# Patient Record
Sex: MALE | Race: WHITE | Employment: FULL TIME | URBAN - NONMETROPOLITAN AREA
[De-identification: names, ages, dates, MRNs, and addresses within clinical notes are randomized per-mention and may not be internally consistent; named-entity substitution may affect disease eponyms.]

---

## 2023-11-04 ENCOUNTER — APPOINTMENT (OUTPATIENT)
Dept: GENERAL RADIOLOGY | Age: 68
End: 2023-11-04
Payer: MEDICARE

## 2023-11-04 ENCOUNTER — HOSPITAL ENCOUNTER (EMERGENCY)
Age: 68
Discharge: HOME | End: 2023-11-04
Attending: EMERGENCY MEDICINE
Payer: MEDICARE

## 2023-11-04 ENCOUNTER — APPOINTMENT (OUTPATIENT)
Dept: CT IMAGING | Age: 68
End: 2023-11-04
Payer: MEDICARE

## 2023-11-04 VITALS
RESPIRATION RATE: 18 BRPM | OXYGEN SATURATION: 99 % | WEIGHT: 230 LBS | HEART RATE: 78 BPM | DIASTOLIC BLOOD PRESSURE: 85 MMHG | TEMPERATURE: 98.3 F | SYSTOLIC BLOOD PRESSURE: 157 MMHG

## 2023-11-04 DIAGNOSIS — R03.0 ELEVATED BLOOD PRESSURE READING WITHOUT DIAGNOSIS OF HYPERTENSION: ICD-10-CM

## 2023-11-04 DIAGNOSIS — R42 DIZZINESS: Primary | ICD-10-CM

## 2023-11-04 LAB
ALBUMIN SERPL-MCNC: 4.2 G/DL (ref 3.5–5.2)
ALP SERPL-CCNC: 51 U/L (ref 40–130)
ALT SERPL-CCNC: 23 U/L (ref 5–41)
ANION GAP SERPL CALCULATED.3IONS-SCNC: 12 MMOL/L (ref 7–19)
AST SERPL-CCNC: 25 U/L (ref 5–40)
BASOPHILS # BLD: 0 K/UL (ref 0–0.2)
BASOPHILS NFR BLD: 0.5 % (ref 0–1)
BILIRUB SERPL-MCNC: 1.5 MG/DL (ref 0.2–1.2)
BUN SERPL-MCNC: 13 MG/DL (ref 8–23)
CALCIUM SERPL-MCNC: 8.8 MG/DL (ref 8.8–10.2)
CHLORIDE SERPL-SCNC: 103 MMOL/L (ref 98–111)
CO2 SERPL-SCNC: 22 MMOL/L (ref 22–29)
CREAT SERPL-MCNC: 0.8 MG/DL (ref 0.5–1.2)
EOSINOPHIL # BLD: 0 K/UL (ref 0–0.6)
EOSINOPHIL NFR BLD: 0 % (ref 0–5)
ERYTHROCYTE [DISTWIDTH] IN BLOOD BY AUTOMATED COUNT: 13.9 % (ref 11.5–14.5)
GLUCOSE SERPL-MCNC: 102 MG/DL (ref 74–109)
HCT VFR BLD AUTO: 37.6 % (ref 42–52)
HGB BLD-MCNC: 12.9 G/DL (ref 14–18)
IMM GRANULOCYTES # BLD: 0 K/UL
INR PPP: 1.04 (ref 0.88–1.18)
LYMPHOCYTES # BLD: 0.8 K/UL (ref 1.1–4.5)
LYMPHOCYTES NFR BLD: 11.7 % (ref 20–40)
MAGNESIUM SERPL-MCNC: 1.9 MG/DL (ref 1.6–2.4)
MCH RBC QN AUTO: 35.4 PG (ref 27–31)
MCHC RBC AUTO-ENTMCNC: 34.3 G/DL (ref 33–37)
MCV RBC AUTO: 103.3 FL (ref 80–94)
MONOCYTES # BLD: 0.4 K/UL (ref 0–0.9)
MONOCYTES NFR BLD: 6.3 % (ref 0–10)
NEUTROPHILS # BLD: 5.2 K/UL (ref 1.5–7.5)
NEUTS SEG NFR BLD: 81 % (ref 50–65)
PLATELET # BLD AUTO: 168 K/UL (ref 130–400)
PMV BLD AUTO: 10.6 FL (ref 9.4–12.4)
POTASSIUM SERPL-SCNC: 3.8 MMOL/L (ref 3.5–5)
PROT SERPL-MCNC: 6.7 G/DL (ref 6.6–8.7)
PROTHROMBIN TIME: 13.3 SEC (ref 12–14.6)
RBC # BLD AUTO: 3.64 M/UL (ref 4.7–6.1)
SODIUM SERPL-SCNC: 137 MMOL/L (ref 136–145)
TROPONIN, HIGH SENSITIVITY: 18 NG/L (ref 0–22)
TSH SERPL DL<=0.005 MIU/L-ACNC: 2.89 UIU/ML (ref 0.27–4.2)
WBC # BLD AUTO: 6.4 K/UL (ref 4.8–10.8)

## 2023-11-04 PROCEDURE — 71045 X-RAY EXAM CHEST 1 VIEW: CPT

## 2023-11-04 PROCEDURE — 36415 COLL VENOUS BLD VENIPUNCTURE: CPT

## 2023-11-04 PROCEDURE — 84484 ASSAY OF TROPONIN QUANT: CPT

## 2023-11-04 PROCEDURE — 84443 ASSAY THYROID STIM HORMONE: CPT

## 2023-11-04 PROCEDURE — 83735 ASSAY OF MAGNESIUM: CPT

## 2023-11-04 PROCEDURE — 99285 EMERGENCY DEPT VISIT HI MDM: CPT

## 2023-11-04 PROCEDURE — 85025 COMPLETE CBC W/AUTO DIFF WBC: CPT

## 2023-11-04 PROCEDURE — 70450 CT HEAD/BRAIN W/O DYE: CPT

## 2023-11-04 PROCEDURE — 85610 PROTHROMBIN TIME: CPT

## 2023-11-04 PROCEDURE — 80053 COMPREHEN METABOLIC PANEL: CPT

## 2023-11-04 ASSESSMENT — ENCOUNTER SYMPTOMS
SHORTNESS OF BREATH: 0
ABDOMINAL PAIN: 0
VOMITING: 0

## 2023-11-04 NOTE — ED PROVIDER NOTES
[facility] EMERGENCY DEPT  EMERGENCY DEPARTMENT ENCOUNTER      Pt Name: Cyril Nicholas  MRN: [id number]  [address] [facility] [place] 1955  Date of evaluation: 11/4/2023  Provider: Lianna Perez DO    CHIEF COMPLAINT       Chief Complaint   Patient presents with    Dizziness     Dizziness with near syncopal episode at work         HISTORY OF PRESENT ILLNESS   (Location/Symptom, Timing/Onset, Context/Setting, Quality, Duration, Modifying Factors, Severity)  Note limiting factors. This is a 81-year-old male presenting to the emergency department secondary to dizziness. The patient was at work mixing paint for a customer when he had a sudden onset of feeling dizzy. The dizziness was described as a sensation that he was going to pass out. The incident lasted approximately 15 minutes and resolved on its own. No complaints of focal weakness, speech changes, visual changes, chest pain, or shortness of breath. The history is provided by the patient. Nursing Notes were reviewed. REVIEW OF SYSTEMS    (2-9 systems for level 4, 10 or more for level 5)     Review of Systems   Respiratory:  Negative for shortness of breath. Gastrointestinal:  Negative for abdominal pain and vomiting. Neurological:  Positive for dizziness. All other systems reviewed and are negative. Except as noted above the remainder of the review of systems was reviewed and negative. PAST MEDICAL HISTORY   No past medical history on file. SURGICAL HISTORY     No past surgical history on file. CURRENT MEDICATIONS       There are no discharge medications for this patient. ALLERGIES     Patient has no known allergies. FAMILY HISTORY     No family history on file.        SOCIAL HISTORY       Social History     Socioeconomic History    Marital status:        SCREENINGS         [place] Coma Scale  Eye Opening: Spontaneous  Best Verbal Response: Oriented  Best Motor Response: Obeys commands  [place] Coma Scale Score: 15 (electronically signed)  Attending Emergency Physician          Monisha Rendon DO  11/05/23 1413

## 2023-11-04 NOTE — DISCHARGE INSTRUCTIONS
Please follow-up with the primary physician within the next week. Return for worsening symptoms or any acute changes.

## 2023-11-06 ENCOUNTER — TELEPHONE (OUTPATIENT)
Dept: NEUROLOGY | Age: 68
End: 2023-11-06

## 2023-11-06 NOTE — TELEPHONE ENCOUNTER
Patient was in the ED and was told to Schedule an appointment with Olu Buckner MD (Neurology) in 1 week (11/11/2023); for the Dizziness. Please called the patient.         Thank you

## 2023-11-15 NOTE — TELEPHONE ENCOUNTER
Spoke with patient, he is aware of appointment time and date. Patient is aware if symptoms worsen to report back to the ED.

## 2024-01-18 ENCOUNTER — OFFICE VISIT (OUTPATIENT)
Dept: NEUROLOGY | Age: 69
End: 2024-01-18

## 2024-01-18 VITALS — SYSTOLIC BLOOD PRESSURE: 138 MMHG | HEART RATE: 89 BPM | DIASTOLIC BLOOD PRESSURE: 89 MMHG | WEIGHT: 230 LBS

## 2024-01-18 DIAGNOSIS — R42 DIZZY: Primary | ICD-10-CM

## 2024-01-18 DIAGNOSIS — Z95.828 HISTORY OF INTRAVASCULAR STENT PLACEMENT: ICD-10-CM

## 2024-01-18 RX ORDER — ASPIRIN 81 MG/1
81 TABLET ORAL DAILY
COMMUNITY

## 2024-01-18 NOTE — PROGRESS NOTES
Chief Complaint   Patient presents with    New Patient     Establishing care for isolated incident of dizziness, patient states he has not symptoms today.        Jones Muller is a 68 y.o. year old male who is seen for evaluation of dizziness.  He had a dizzy spell in November, approximately 2 months ago.  He was at work.  He felt like his thinking and talking more slow.  Someone told him his speech was slurred.  He lost his balance.  The whole episode lasted about 5 minutes.  Responders on the scene found no abnormalities.  He went to the ED and had a negative CT of the brain and routine blood studies.  Those records are reviewed today.  The cause for his symptoms was uncertain and he was referred here.  Denies any further difficulty since that time.  Does have a history of a coronary artery stent back in 2013.  He is supposed to be on aspirin daily but takes it sporadically.  Denies any signs or symptoms suggestive of ongoing TIAs or seizure disorder..    Active Ambulatory Problems     Diagnosis Date Noted    No Active Ambulatory Problems     Resolved Ambulatory Problems     Diagnosis Date Noted    No Resolved Ambulatory Problems     No Additional Past Medical History       Past Surgical History:   Procedure Laterality Date    CORONARY STENT PLACEMENT  2013 2013 heart attack , coronary stent  Dr. Santiago  [place], [place]  [phone]       Family History   Problem Relation Age of Onset    Prostate Cancer Father     Kidney stones Father     Heart Attack Father        No Known Allergies    Social History     Socioeconomic History    Marital status:      Spouse name: Not on file    Number of children: Not on file    Years of education: Not on file    Highest education level: Not on file   Occupational History    Not on file   Tobacco Use    Smoking status: Never    Smokeless tobacco: Never   Substance and Sexual Activity    Alcohol use: Not Currently    Drug use: Never    Sexual activity: Not Currently

## 2024-11-01 ENCOUNTER — HOSPITAL ENCOUNTER (INPATIENT)
Age: 69
LOS: 1 days | DRG: 283 | End: 2024-11-02
Attending: EMERGENCY MEDICINE | Admitting: INTERNAL MEDICINE
Payer: MEDICARE

## 2024-11-01 ENCOUNTER — APPOINTMENT (OUTPATIENT)
Age: 69
DRG: 283 | End: 2024-11-01
Attending: INTERNAL MEDICINE
Payer: MEDICARE

## 2024-11-01 ENCOUNTER — APPOINTMENT (OUTPATIENT)
Dept: VASCULAR LAB | Age: 69
DRG: 283 | End: 2024-11-01
Attending: INTERNAL MEDICINE
Payer: MEDICARE

## 2024-11-01 DIAGNOSIS — R57.0 CARDIOGENIC SHOCK: ICD-10-CM

## 2024-11-01 DIAGNOSIS — I21.4 NSTEMI (NON-ST ELEVATED MYOCARDIAL INFARCTION) (HCC): Primary | ICD-10-CM

## 2024-11-01 DIAGNOSIS — I21.3 STEMI (ST ELEVATION MYOCARDIAL INFARCTION) (HCC): ICD-10-CM

## 2024-11-01 DIAGNOSIS — I46.9 CARDIAC ARREST: ICD-10-CM

## 2024-11-01 DIAGNOSIS — I50.9 ACUTE CONGESTIVE HEART FAILURE, UNSPECIFIED HEART FAILURE TYPE (HCC): ICD-10-CM

## 2024-11-01 DIAGNOSIS — I26.94 MULTIPLE SUBSEGMENTAL PULMONARY EMBOLI WITHOUT ACUTE COR PULMONALE (HCC): ICD-10-CM

## 2024-11-01 LAB
ALBUMIN SERPL-MCNC: 4.1 G/DL (ref 3.5–5.2)
ALP SERPL-CCNC: 50 U/L (ref 40–129)
ALT SERPL-CCNC: 38 U/L (ref 5–41)
ANION GAP SERPL CALCULATED.3IONS-SCNC: 17 MMOL/L (ref 7–19)
ANTI-XA UNFRAC HEPARIN: <0.1 IU/ML
ANTI-XA UNFRAC HEPARIN: >1.1 IU/ML
AST SERPL-CCNC: 51 U/L (ref 5–40)
BASOPHILS # BLD: 0 K/UL (ref 0–0.2)
BASOPHILS NFR BLD: 0.3 % (ref 0–1)
BILIRUB SERPL-MCNC: 2.7 MG/DL (ref 0.2–1.2)
BNP BLD-MCNC: ABNORMAL PG/ML (ref 0–124)
BUN SERPL-MCNC: 17 MG/DL (ref 8–23)
CALCIUM SERPL-MCNC: 8.8 MG/DL (ref 8.8–10.2)
CHLORIDE SERPL-SCNC: 101 MMOL/L (ref 98–111)
CO2 SERPL-SCNC: 22 MMOL/L (ref 22–29)
CREAT SERPL-MCNC: 0.9 MG/DL (ref 0.7–1.2)
ECHO AO ASC DIAM: 2.9 CM
ECHO AO ASCENDING AORTA INDEX: 1.32 CM/M2
ECHO AO ROOT DIAM: 2.1 CM
ECHO AO ROOT INDEX: 0.95 CM/M2
ECHO AO SINUS VALSALVA DIAM: 2.8 CM
ECHO AO SINUS VALSALVA INDEX: 1.27 CM/M2
ECHO AO ST JNCT DIAM: 2.5 CM
ECHO AV AREA PEAK VELOCITY: 2.4 CM2
ECHO AV AREA VTI: 2.7 CM2
ECHO AV AREA/BSA PEAK VELOCITY: 1.1 CM2/M2
ECHO AV AREA/BSA VTI: 1.2 CM2/M2
ECHO AV MEAN GRADIENT: 3 MMHG
ECHO AV MEAN VELOCITY: 0.8 M/S
ECHO AV PEAK GRADIENT: 4 MMHG
ECHO AV PEAK VELOCITY: 1 M/S
ECHO AV REGURGITANT FRACTION: -2649 %
ECHO AV REGURGITANT VOLUME: -1066.8 ML
ECHO AV VELOCITY RATIO: 0.6
ECHO AV VTI: 14.8 CM
ECHO BSA: 2.24 M2
ECHO EST RA PRESSURE: 3 MMHG
ECHO IVC PROX: 1.8 CM
ECHO LA AREA 2C: 23.7 CM2
ECHO LA AREA 4C: 24.2 CM2
ECHO LA DIAMETER INDEX: 2.09 CM/M2
ECHO LA DIAMETER: 4.6 CM
ECHO LA MAJOR AXIS: 5.8 CM
ECHO LA MINOR AXIS: 6.2 CM
ECHO LA TO AORTIC ROOT RATIO: 2.19
ECHO LA VOL BP: 79 ML (ref 18–58)
ECHO LA VOL MOD A2C: 75 ML (ref 18–58)
ECHO LA VOL MOD A4C: 83 ML (ref 18–58)
ECHO LA VOL/BSA BIPLANE: 36 ML/M2 (ref 16–34)
ECHO LA VOLUME INDEX MOD A2C: 34 ML/M2 (ref 16–34)
ECHO LA VOLUME INDEX MOD A4C: 38 ML/M2 (ref 16–34)
ECHO LV E' LATERAL VELOCITY: 6 CM/S
ECHO LV E' SEPTAL VELOCITY: 4.7 CM/S
ECHO LV EDV A2C: 281 ML
ECHO LV EDV A4C: 270 ML
ECHO LV EDV INDEX A4C: 123 ML/M2
ECHO LV EDV NDEX A2C: 128 ML/M2
ECHO LV EJECTION FRACTION A2C: 18 %
ECHO LV EJECTION FRACTION A4C: 20 %
ECHO LV EJECTION FRACTION BIPLANE: 20 % (ref 55–100)
ECHO LV ESV A2C: 230 ML
ECHO LV ESV A4C: 214 ML
ECHO LV ESV INDEX A2C: 105 ML/M2
ECHO LV ESV INDEX A4C: 97 ML/M2
ECHO LV FRACTIONAL SHORTENING: 5 % (ref 28–44)
ECHO LV INTERNAL DIMENSION DIASTOLE INDEX: 2.91 CM/M2
ECHO LV INTERNAL DIMENSION DIASTOLIC: 6.4 CM (ref 4.2–5.9)
ECHO LV INTERNAL DIMENSION SYSTOLIC INDEX: 2.77 CM/M2
ECHO LV INTERNAL DIMENSION SYSTOLIC: 6.1 CM
ECHO LV ISOVOLUMETRIC RELAXATION TIME (IVRT): 92 MS
ECHO LV IVSD: 1.1 CM (ref 0.6–1)
ECHO LV MASS 2D: 311.7 G (ref 88–224)
ECHO LV MASS INDEX 2D: 141.7 G/M2 (ref 49–115)
ECHO LV POSTERIOR WALL DIASTOLIC: 1.1 CM (ref 0.6–1)
ECHO LV RELATIVE WALL THICKNESS RATIO: 0.34
ECHO LVOT AREA: 3.8 CM2
ECHO LVOT AV VTI INDEX: 0.72
ECHO LVOT DIAM: 2.2 CM
ECHO LVOT MEAN GRADIENT: 1 MMHG
ECHO LVOT PEAK GRADIENT: 2 MMHG
ECHO LVOT PEAK VELOCITY: 0.6 M/S
ECHO LVOT STROKE VOLUME INDEX: 18.3 ML/M2
ECHO LVOT SV: 40.3 ML
ECHO LVOT VTI: 10.6 CM
ECHO MV A VELOCITY: 0.43 M/S
ECHO MV ANNULUS DIAMETER: 3.4 CM
ECHO MV AREA VTI: 2 CM2
ECHO MV E DECELERATION TIME (DT): 138 MS
ECHO MV E VELOCITY: 0.86 M/S
ECHO MV E/A RATIO: 2
ECHO MV E/E' LATERAL: 14.33
ECHO MV E/E' RATIO (AVERAGED): 16.32
ECHO MV E/E' SEPTAL: 18.3
ECHO MV LVOT VTI INDEX: 1.91
ECHO MV MAX VELOCITY: 0.8 M/S
ECHO MV MEAN GRADIENT: 2 MMHG
ECHO MV MEAN VELOCITY: 0.5 M/S
ECHO MV PEAK GRADIENT: 3 MMHG
ECHO MV REGURGITANT FRACTION CONT EQ: 96 %
ECHO MV REGURGITANT PEAK GRADIENT: 88 MMHG
ECHO MV REGURGITANT PEAK VELOCITY: 4.7 M/S
ECHO MV REGURGITANT VOLUME: 1066.83 ML
ECHO MV REGURGITANT VTIA: 122 CM
ECHO MV VTI: 20.2 CM
ECHO RA AREA 4C: 12.3 CM2
ECHO RA END SYSTOLIC VOLUME APICAL 4 CHAMBER INDEX BSA: 14 ML/M2
ECHO RA MAJOR AXIS INDEX: 2.05 CM/M2
ECHO RA MAJOR AXIS: 4.5 CM
ECHO RA MINOR AXIS INDEX: 1.59 CM/M2
ECHO RA MINOR AXIS: 3.5 CM
ECHO RA VOLUME: 30 ML
ECHO RIGHT VENTRICULAR SYSTOLIC PRESSURE (RVSP): 28 MMHG
ECHO RV BASAL DIMENSION: 3.5 CM
ECHO RV INTERNAL DIMENSION: 3 CM
ECHO RV LONGITUDINAL DIMENSION: 10.1 CM
ECHO RV MID DIMENSION: 3.4 CM
ECHO RV TAPSE: 1.4 CM (ref 1.7–?)
ECHO TV REGURGITANT MAX VELOCITY: 2.51 M/S
ECHO TV REGURGITANT PEAK GRADIENT: 25 MMHG
EOSINOPHIL # BLD: 0 K/UL (ref 0–0.6)
EOSINOPHIL NFR BLD: 0 % (ref 0–5)
ERYTHROCYTE [DISTWIDTH] IN BLOOD BY AUTOMATED COUNT: 15.6 % (ref 11.5–14.5)
GLUCOSE SERPL-MCNC: 140 MG/DL (ref 70–99)
HCT VFR BLD AUTO: 38.2 % (ref 42–52)
HGB BLD-MCNC: 12.1 G/DL (ref 14–18)
IMM GRANULOCYTES # BLD: 0.1 K/UL
LYMPHOCYTES # BLD: 0.9 K/UL (ref 1.1–4.5)
LYMPHOCYTES NFR BLD: 9.6 % (ref 20–40)
MAGNESIUM SERPL-MCNC: 2.1 MG/DL (ref 1.6–2.4)
MCH RBC QN AUTO: 34.1 PG (ref 27–31)
MCHC RBC AUTO-ENTMCNC: 31.7 G/DL (ref 33–37)
MCV RBC AUTO: 107.6 FL (ref 80–94)
MONOCYTES # BLD: 0.5 K/UL (ref 0–0.9)
MONOCYTES NFR BLD: 5.7 % (ref 0–10)
NEUTROPHILS # BLD: 7.7 K/UL (ref 1.5–7.5)
NEUTS SEG NFR BLD: 83.5 % (ref 50–65)
PLATELET # BLD AUTO: 207 K/UL (ref 130–400)
PMV BLD AUTO: 12.1 FL (ref 9.4–12.4)
POTASSIUM SERPL-SCNC: 4.2 MMOL/L (ref 3.5–5)
PROT SERPL-MCNC: 6.9 G/DL (ref 6.4–8.3)
RBC # BLD AUTO: 3.55 M/UL (ref 4.7–6.1)
SODIUM SERPL-SCNC: 140 MMOL/L (ref 136–145)
TROPONIN, HIGH SENSITIVITY: 1021 NG/L (ref 0–22)
TROPONIN, HIGH SENSITIVITY: 760 NG/L (ref 0–22)
WBC # BLD AUTO: 9.3 K/UL (ref 4.8–10.8)

## 2024-11-01 PROCEDURE — C8929 TTE W OR WO FOL WCON,DOPPLER: HCPCS

## 2024-11-01 PROCEDURE — 2700000000 HC OXYGEN THERAPY PER DAY

## 2024-11-01 PROCEDURE — 85025 COMPLETE CBC W/AUTO DIFF WBC: CPT

## 2024-11-01 PROCEDURE — 6360000002 HC RX W HCPCS: Performed by: INTERNAL MEDICINE

## 2024-11-01 PROCEDURE — 83880 ASSAY OF NATRIURETIC PEPTIDE: CPT

## 2024-11-01 PROCEDURE — 2580000003 HC RX 258: Performed by: INTERNAL MEDICINE

## 2024-11-01 PROCEDURE — 99285 EMERGENCY DEPT VISIT HI MDM: CPT

## 2024-11-01 PROCEDURE — 99223 1ST HOSP IP/OBS HIGH 75: CPT | Performed by: INTERNAL MEDICINE

## 2024-11-01 PROCEDURE — 96375 TX/PRO/DX INJ NEW DRUG ADDON: CPT

## 2024-11-01 PROCEDURE — 84484 ASSAY OF TROPONIN QUANT: CPT

## 2024-11-01 PROCEDURE — 93970 EXTREMITY STUDY: CPT

## 2024-11-01 PROCEDURE — 83735 ASSAY OF MAGNESIUM: CPT

## 2024-11-01 PROCEDURE — 93005 ELECTROCARDIOGRAM TRACING: CPT | Performed by: EMERGENCY MEDICINE

## 2024-11-01 PROCEDURE — 96374 THER/PROPH/DIAG INJ IV PUSH: CPT

## 2024-11-01 PROCEDURE — 80053 COMPREHEN METABOLIC PANEL: CPT

## 2024-11-01 PROCEDURE — 6370000000 HC RX 637 (ALT 250 FOR IP): Performed by: INTERNAL MEDICINE

## 2024-11-01 PROCEDURE — 6360000002 HC RX W HCPCS: Performed by: EMERGENCY MEDICINE

## 2024-11-01 PROCEDURE — 2000000000 HC ICU R&B

## 2024-11-01 PROCEDURE — 6360000004 HC RX CONTRAST MEDICATION: Performed by: INTERNAL MEDICINE

## 2024-11-01 PROCEDURE — 85520 HEPARIN ASSAY: CPT

## 2024-11-01 PROCEDURE — 36415 COLL VENOUS BLD VENIPUNCTURE: CPT

## 2024-11-01 RX ORDER — FUROSEMIDE 10 MG/ML
40 INJECTION INTRAMUSCULAR; INTRAVENOUS ONCE
Status: COMPLETED | OUTPATIENT
Start: 2024-11-01 | End: 2024-11-01

## 2024-11-01 RX ORDER — HYDRALAZINE HYDROCHLORIDE 20 MG/ML
10 INJECTION INTRAMUSCULAR; INTRAVENOUS EVERY 6 HOURS PRN
Status: DISCONTINUED | OUTPATIENT
Start: 2024-11-01 | End: 2024-11-02 | Stop reason: HOSPADM

## 2024-11-01 RX ORDER — HEPARIN SODIUM 1000 [USP'U]/ML
2000 INJECTION, SOLUTION INTRAVENOUS; SUBCUTANEOUS PRN
Status: DISCONTINUED | OUTPATIENT
Start: 2024-11-01 | End: 2024-11-02 | Stop reason: HOSPADM

## 2024-11-01 RX ORDER — POTASSIUM CHLORIDE 29.8 MG/ML
20 INJECTION INTRAVENOUS PRN
Status: DISCONTINUED | OUTPATIENT
Start: 2024-11-01 | End: 2024-11-02 | Stop reason: HOSPADM

## 2024-11-01 RX ORDER — ASPIRIN 81 MG/1
81 TABLET ORAL DAILY
Status: DISCONTINUED | OUTPATIENT
Start: 2024-11-01 | End: 2024-11-02 | Stop reason: HOSPADM

## 2024-11-01 RX ORDER — POTASSIUM CHLORIDE 7.45 MG/ML
10 INJECTION INTRAVENOUS PRN
Status: DISCONTINUED | OUTPATIENT
Start: 2024-11-01 | End: 2024-11-02 | Stop reason: HOSPADM

## 2024-11-01 RX ORDER — ACETAMINOPHEN 325 MG/1
650 TABLET ORAL EVERY 6 HOURS PRN
Status: DISCONTINUED | OUTPATIENT
Start: 2024-11-01 | End: 2024-11-02 | Stop reason: HOSPADM

## 2024-11-01 RX ORDER — NITROGLYCERIN 20 MG/100ML
5-200 INJECTION INTRAVENOUS CONTINUOUS
Status: DISCONTINUED | OUTPATIENT
Start: 2024-11-01 | End: 2024-11-02 | Stop reason: HOSPADM

## 2024-11-01 RX ORDER — HEPARIN SODIUM 1000 [USP'U]/ML
4000 INJECTION, SOLUTION INTRAVENOUS; SUBCUTANEOUS PRN
Status: DISCONTINUED | OUTPATIENT
Start: 2024-11-01 | End: 2024-11-02 | Stop reason: HOSPADM

## 2024-11-01 RX ORDER — LABETALOL HYDROCHLORIDE 5 MG/ML
20 INJECTION, SOLUTION INTRAVENOUS EVERY 4 HOURS PRN
Status: DISCONTINUED | OUTPATIENT
Start: 2024-11-01 | End: 2024-11-02 | Stop reason: HOSPADM

## 2024-11-01 RX ORDER — ATORVASTATIN CALCIUM 40 MG/1
40 TABLET, FILM COATED ORAL NIGHTLY
Status: DISCONTINUED | OUTPATIENT
Start: 2024-11-01 | End: 2024-11-02 | Stop reason: HOSPADM

## 2024-11-01 RX ORDER — MAGNESIUM SULFATE IN WATER 40 MG/ML
2000 INJECTION, SOLUTION INTRAVENOUS PRN
Status: DISCONTINUED | OUTPATIENT
Start: 2024-11-01 | End: 2024-11-02 | Stop reason: HOSPADM

## 2024-11-01 RX ORDER — ACETAMINOPHEN 650 MG/1
650 SUPPOSITORY RECTAL EVERY 6 HOURS PRN
Status: DISCONTINUED | OUTPATIENT
Start: 2024-11-01 | End: 2024-11-02 | Stop reason: HOSPADM

## 2024-11-01 RX ORDER — HEPARIN SODIUM 10000 [USP'U]/100ML
5-30 INJECTION, SOLUTION INTRAVENOUS CONTINUOUS
Status: DISCONTINUED | OUTPATIENT
Start: 2024-11-01 | End: 2024-11-02 | Stop reason: HOSPADM

## 2024-11-01 RX ADMIN — ATORVASTATIN CALCIUM 40 MG: 40 TABLET, FILM COATED ORAL at 20:21

## 2024-11-01 RX ADMIN — NITROGLYCERIN 5 MCG/MIN: 20 INJECTION INTRAVENOUS at 13:45

## 2024-11-01 RX ADMIN — SODIUM CHLORIDE, PRESERVATIVE FREE 1.5 ML: 5 INJECTION INTRAVENOUS at 14:59

## 2024-11-01 RX ADMIN — FUROSEMIDE 5 MG/HR: 100 INJECTION, SOLUTION INTRAMUSCULAR; INTRAVENOUS at 15:28

## 2024-11-01 RX ADMIN — HEPARIN SODIUM 4000 UNITS: 1000 INJECTION INTRAVENOUS; SUBCUTANEOUS at 20:20

## 2024-11-01 RX ADMIN — FUROSEMIDE 40 MG: 10 INJECTION, SOLUTION INTRAMUSCULAR; INTRAVENOUS at 13:41

## 2024-11-01 RX ADMIN — HEPARIN SODIUM 10.02 UNITS/KG/HR: 10000 INJECTION, SOLUTION INTRAVENOUS at 13:41

## 2024-11-01 ASSESSMENT — ENCOUNTER SYMPTOMS
SHORTNESS OF BREATH: 1
ABDOMINAL PAIN: 0
GASTROINTESTINAL NEGATIVE: 1
VOMITING: 0
ALLERGIC/IMMUNOLOGIC NEGATIVE: 1
NAUSEA: 1
EYES NEGATIVE: 1

## 2024-11-01 ASSESSMENT — PAIN SCALES - GENERAL: PAINLEVEL_OUTOF10: 0

## 2024-11-01 NOTE — PROGRESS NOTES
Vascular lab preliminary notes.  Bilateral lower extremity venous duplex scan performed.   No evidence of DVT, SVT, or reflux in areas visualized at this time.   Final report pending.

## 2024-11-01 NOTE — ED NOTES
Per [facility] notes, pt had 324mg ASA PO, 5000 units of heparin bolus IV push, 3 doses of SL nitroglycerin along with contrast IV dye for CT

## 2024-11-01 NOTE — ED NOTES
Called Dr. Bruno, Cardiology, to confirm that patient is not going to Cath Lab today.  Patient is not going today.

## 2024-11-01 NOTE — DISCHARGE INSTRUCTIONS
Your Cardiologist has referred you for participation in Cardiac Rehabilitation and a consult has been sent to the [facility] Cardiac Rehab program.     You are to contact [facility] WITHIN ONE WEEK AFTER HOSPITAL DISCHARGE to schedule your \"orientation & assessment\" appointment by calling direct at [phone].

## 2024-11-01 NOTE — CONSULTS
[facility]  Cardiology Consult      Requesting MD:  Mike Fernandez MD   Admit Status:         History obtained from:   [] Patient  [] Other (specify):     Patient:  Jones Muller  [id number]     Chief Complaint:   Chief Complaint   Patient presents with    Chest Pain     Sent from [facility]. Possible stemi. CP started 3 days ago    Shortness of Breath     X3 days. Scans at [facility] shows pulmonary embolism          HPI:     69 y.o. male who presents to the emergency department via EMS for evaluation regarding episode of chest pain.  Patient had been seen at [facility] emergency department and has been auto accepted for possible STEMI here to our ED.  On arrival to ED he is not actively having acute chest pain.  States he had an episode of pressure and tightness in his anterior chest associated with feelings of shortness of breath that occurred this morning at around 7:30 AM.  At [facility] emergency department he underwent aspirin, nitroglycerin infusion, heparin infusion and was noted to have possible ST change along with elevated troponin.  Patient does have a prior history of previous coronary artery disease with prior stent placement back in 2013.  He has not had any recent stress testing since that time.  Denies recent illnesses, fever or chills.  Patient also notes another episode a couple of days ago when he was cutting some wood that he had some pain over his anterior chest associated with feelings of difficulty breathing.     CTA chest performed at [facility] facility did reveal evidence of left lower lobe pulmonary embolism.  No evidence of ventricular strain was identified.      ECHO showed akinetic septum, EF 20%the patient has no chest pain    Review of Systems   Constitutional: Negative.    HENT: Negative.     Eyes: Negative.    Respiratory:  Positive for shortness of breath.    Cardiovascular:  Positive for chest pain, palpitations and leg swelling.   Gastrointestinal:  meds  Cardiology fu in [place]  D/w pt    Asa 2/mallapatti 2

## 2024-11-01 NOTE — CONSULTS
MEDICAL ONCOLOGY CONSULTATION    Pt Name: Jones Muller  MRN: [id number]  YOB: 1955  Date of evaluation: 11/1/2024    REASON FOR CONSULTATION: Pulmonary [place], management of anticoagulation  REQUESTING PHYSICIAN: Hospitalist    History Obtained From:    patient, electronic medical record    HISTORY OF PRESENT ILLNESS:  Jones Muller was first seen by me on 11/1/2024.  The patient presented to the ER department at [facility] with complaints of chest pain.  Patient was transferred to [facility] for possible STEMI.  Patient has a history of CAD and prior stent placement back in 2013.  Apparently, patient had CTA chest performed outside hospital that showed left lower lobe pulmonary embolism.  Patient denies any prior personal or family history of thromboembolic phenomena.  Patient was started on anticoagulation with heparin.  Patient was seen by cardiology.  Plans for cardiac cath tomorrow.  CTA chest outside hospital-positive for left lower lobe pulmonary embolism  Laboratory studies remarkable for macrocytic anemia, elevated troponin, elevated total bilirubin.      Past Medical History:    Past Medical History:   Diagnosis Date    CAD (coronary artery disease)        Past Surgical History:    Past Surgical History:   Procedure Laterality Date    CORONARY STENT PLACEMENT  2013 2013 heart attack, LAD stent  Dr. Santiago  [place], [place]  [phone]       Social History:    Marital status:   Smoking status: No  ETOH status: No  Resides:[place], [place]    Family History:   Family History   Problem Relation Age of Onset    Prostate Cancer Father     Kidney stones Father     Heart Attack Father        Current Hospital Medications:    Current Facility-Administered Medications   Medication Dose Route Frequency Provider Last Rate Last Admin    nitroGLYCERIN 200 mcg/mL in dextrose 5%  5-200 mcg/min IntraVENous Continuous Mike Fernandez MD 1.5 mL/hr at 11/01/24 1823 5 mcg/min at 11/01/24 1823    heparin

## 2024-11-01 NOTE — H&P
Hospitalist History & Physical  [facility]    Patient: Jones Muller   : 1955   MRN: [id number]  Code Status: Full Code  PCP: Brian García MD  Date of Service: 2024    Chief Complaint:   Shortness of breath    History of Present Illness:   69-year-old male with past medical history as listed below initially presented to [facility] ER with shortness of breath.  CTA chest at OSH revealed LLL PE.  Transferred to this facility for possible STEMI.  Workup in ER at this facility revealed NSTEMI.  Cardiology following.  Significant history of CAD s/p LAD stent in .  Upon interview and examination, patient states he became short of breath while using the bathroom this morning.  Sharp midsternal chest pain with inspiration.  No radiation.  He states he has been short of breath for some time now but it became worse this morning.  Denies recent travel, sedentary lifestyle, or known malignancy.  Denies any further complaints at this time.  Admitted to critical care unit.    Review of Systems:   A comprehensive review of systems was negative except for: Respiratory: positive for shortness of breath  Cardiovascular: positive for exertional chest pressure/discomfort    Past Medical History:     Past Medical History:   Diagnosis Date    CAD (coronary artery disease)          Past Surgical History:     Past Surgical History:   Procedure Laterality Date    CORONARY STENT PLACEMENT  2013 heart attack, LAD stent  Dr. Santiago  [place], [place]  [phone]        Family History:     Family History   Problem Relation Age of Onset    Prostate Cancer Father     Kidney stones Father     Heart Attack Father         Social History:     Social History     Socioeconomic History    Marital status:      Spouse name: None    Number of children: None    Years of education: None    Highest education level: None   Tobacco Use    Smoking status: Never    Smokeless tobacco: Never   Substance and Sexual  typographical errors in transcription may have occurred.    Mike Fernandez MD  11/1/2024 2:58 PM

## 2024-11-01 NOTE — PROGRESS NOTES
Heparin Infusion Initiation  Patient on heparin for:   CAD/STEMI/NSTEMI/UA/Afib    Heparin dosing: order for weight based protocol   No results for input(s): \"PLT\", \"HGB\", \"INR\", \"APTT\", \"HEPXALMWHUNF\" in the last 72 hours.    Factor Xa inhibitor/LMWH use within the past 72 hours? NO  If yes, date of last administration: N/A    Does patient have hypertriglyceridemia (> 414 mg/dL) or hyperbilirubinemia (> 37 mg/dL)? NO  No results for input(s): \"BILITOT\", \"TRIG\" in the last 72 hours.    Heparin to be monitored using anti-Xa for duration of therapy.    Have admin instructions been updated to reflect appropriate protocol? YES    Have appropriate labs been ordered for corresponding protocol? YES   *Discontinue anti-Xa lab monitoring if using aPTT protocol    Initial Dosing Weight: 99.8 kg  Starting rate:  10 unit/kg/hr

## 2024-11-01 NOTE — ED PROVIDER NOTES
[facility] EMERGENCY DEPT  eMERGENCY dEPARTMENT eNCOUnter      Pt Name: Jones Muller  MRN: [id number]  Birthdate 1955  Date of evaluation: 11/1/2024  Provider: Allan Mcnulty MD    CHIEF COMPLAINT       Chief Complaint   Patient presents with    Chest Pain     Sent from [facility]. Possible stemi. CP started 3 days ago    Shortness of Breath     X3 days. Scans at [facility] shows pulmonary embolism          HISTORY OF PRESENT ILLNESS   (Location/Symptom, Timing/Onset,Context/Setting, Quality, Duration, Modifying Factors, Severity)  Note limiting factors.   Jones Muller is a 69 y.o. male who presents to the emergency department via EMS for evaluation regarding episode of chest pain.  [facility] and has been auto accepted for possible STEMI here to our ED.  On arrival to ED he is not actively having acute chest pain.  States he had an episode of pressure and tightness in his anterior chest associated with feelings of shortness of breath that occurred this morning at around 7:30 AM.  [facility] he underwent aspirin, nitroglycerin infusion, heparin infusion and was noted to have possible ST change along with elevated troponin.  Patient does have a prior history of previous coronary artery disease with prior stent placement back in 2013.  He has not had any recent stress testing since that time.  Denies recent illnesses, fever or chills.  Patient also notes another episode a couple of days ago when he was cutting some wood that he had some pain over his anterior chest associated with feelings of difficulty breathing.    [facility] facility did reveal evidence of left lower lobe pulmonary embolism.  No evidence of ventricular strain was identified.    HPI    NursingNotes were reviewed.    REVIEW OF SYSTEMS    (2-9 systems for level 4, 10 or more for level 5)     Review of Systems   Constitutional:  Negative for chills and fever.  11/01/2024  13:15, by ANDGR   BRAIN NATRIURETIC PEPTIDE - Abnormal; Notable for the following components:    NT Pro-BNP 28,347 (*)     All other components within normal limits   ANTI-XA, UNFRACTIONATED HEPARIN   ANTI-XA, UNFRACTIONATED HEPARIN       All other labs were within normal range or not returned as of this dictation.    EMERGENCY DEPARTMENT COURSE and DIFFERENTIAL DIAGNOSIS/MDM:   Vitals:    Vitals:    11/01/24 1247 11/01/24 1249 11/01/24 1259 11/01/24 1302   BP: (!) 153/103   136/84   Pulse: (!) 107  (!) 101 (!) 101   Resp: 18 24 24   Temp:  97.1 °F (36.2 °C)     SpO2: 97%  98% 97%   Weight:       Height:           MDM    I have independently reviewed patient's laboratory studies, prior records.  His BNP is elevated at 28,000 with a high-sensitivity troponin of 760.  Patient is not actively having acute chest pain at this time.  He is on nitroglycerin infusion at 5 mcg along with a heparin infusion.  He will require inpatient admission for further evaluation and management.    I completed the Chest Pain Pathway to screen for Acute Coronary Syndrome (ACS) in this patient.  After review of the EKG, laboratory studies and radiology studies in conjunction with patients calculated HEART Score of 7,  indicating that the patient is high risk for ACS, patient has been recommended for admission.     CONSULTS:    Patient has been seen in consultation by on-call cardiology, Dr. Bruno here in the emergency department.  We have reviewed patient's laboratory studies, prior records with plans for admission to the hospitalist service and plans to proceed to cardiac catheterization after 2D echocardiogram.    Case was discussed with Dr. Fernandez regarding plans for inpatient admission to the ICU.    PROCEDURES:  Unless otherwise noted below, none     Procedures    CRITICAL CARE TIME   Total Critical Care time was 44 minutes, excluding separately reportable procedures.  There was a high probability of clinically

## 2024-11-01 NOTE — ED NOTES
Pt currently has Nitro infusing at 5mcg/min and heparin 1000units/hr that was started at [facility]

## 2024-11-02 ENCOUNTER — APPOINTMENT (OUTPATIENT)
Dept: GENERAL RADIOLOGY | Age: 69
DRG: 283 | End: 2024-11-02
Payer: MEDICARE

## 2024-11-02 VITALS
TEMPERATURE: 97.9 F | OXYGEN SATURATION: 82 % | HEART RATE: 56 BPM | RESPIRATION RATE: 18 BRPM | DIASTOLIC BLOOD PRESSURE: 121 MMHG | HEIGHT: 71 IN | BODY MASS INDEX: 29.72 KG/M2 | WEIGHT: 212.3 LBS | SYSTOLIC BLOOD PRESSURE: 140 MMHG

## 2024-11-02 LAB
ALBUMIN SERPL-MCNC: 3.9 G/DL (ref 3.5–5.2)
ALP SERPL-CCNC: 44 U/L (ref 40–129)
ALT SERPL-CCNC: 32 U/L (ref 5–41)
ANION GAP SERPL CALCULATED.3IONS-SCNC: 14 MMOL/L (ref 7–19)
ANTI-XA UNFRAC HEPARIN: 0.33 IU/ML
AST SERPL-CCNC: 40 U/L (ref 5–40)
BASOPHILS # BLD: 0 K/UL (ref 0–0.2)
BASOPHILS NFR BLD: 0.5 % (ref 0–1)
BILIRUB DIRECT SERPL-MCNC: 0.4 MG/DL (ref 0–0.3)
BILIRUB INDIRECT SERPL-MCNC: 2 MG/DL (ref 0–1)
BILIRUB SERPL-MCNC: 2.4 MG/DL (ref 0.2–1.2)
BUN SERPL-MCNC: 22 MG/DL (ref 8–23)
CALCIUM SERPL-MCNC: 8.4 MG/DL (ref 8.8–10.2)
CHLORIDE SERPL-SCNC: 99 MMOL/L (ref 98–111)
CHOLEST SERPL-MCNC: 157 MG/DL (ref 0–199)
CO2 SERPL-SCNC: 27 MMOL/L (ref 22–29)
CREAT SERPL-MCNC: 1 MG/DL (ref 0.7–1.2)
ECHO BSA: 2.24 M2
EOSINOPHIL # BLD: 0 K/UL (ref 0–0.6)
EOSINOPHIL NFR BLD: 0.1 % (ref 0–5)
ERYTHROCYTE [DISTWIDTH] IN BLOOD BY AUTOMATED COUNT: 15.6 % (ref 11.5–14.5)
FERRITIN SERPL-MCNC: 278.2 NG/ML (ref 30–400)
FOLATE SERPL-MCNC: 7 NG/ML (ref 4.5–32.2)
GLUCOSE BLD-MCNC: 157 MG/DL (ref 70–99)
GLUCOSE SERPL-MCNC: 101 MG/DL (ref 70–99)
HAPTOGLOB SERPL-MCNC: 166 MG/DL (ref 30–200)
HBA1C MFR BLD: 5.1 % (ref 4–5.6)
HCT VFR BLD AUTO: 35.2 % (ref 42–52)
HDLC SERPL-MCNC: 40 MG/DL (ref 40–60)
HGB BLD-MCNC: 11.7 G/DL (ref 14–18)
IMM GRANULOCYTES # BLD: 0.1 K/UL
IRON SATN MFR SERPL: 36 % (ref 14–50)
IRON SERPL-MCNC: 92 UG/DL (ref 59–158)
LDLC SERPL CALC-MCNC: 101 MG/DL
LYMPHOCYTES # BLD: 2 K/UL (ref 1.1–4.5)
LYMPHOCYTES NFR BLD: 25.3 % (ref 20–40)
MAGNESIUM SERPL-MCNC: 1.9 MG/DL (ref 1.6–2.4)
MCH RBC QN AUTO: 34.7 PG (ref 27–31)
MCHC RBC AUTO-ENTMCNC: 33.2 G/DL (ref 33–37)
MCV RBC AUTO: 104.5 FL (ref 80–94)
MONOCYTES # BLD: 0.7 K/UL (ref 0–0.9)
MONOCYTES NFR BLD: 9.4 % (ref 0–10)
NEUTROPHILS # BLD: 5 K/UL (ref 1.5–7.5)
NEUTS SEG NFR BLD: 64.1 % (ref 50–65)
PERFORMED ON: ABNORMAL
PLATELET # BLD AUTO: 195 K/UL (ref 130–400)
PMV BLD AUTO: 11.9 FL (ref 9.4–12.4)
POTASSIUM SERPL-SCNC: 3 MMOL/L (ref 3.5–5)
PROT SERPL-MCNC: 6.5 G/DL (ref 6.4–8.3)
RBC # BLD AUTO: 3.37 M/UL (ref 4.7–6.1)
RETICS # AUTO: 0.1 M/UL (ref 0.03–0.12)
RETICS/RBC NFR: 3.04 % (ref 0.5–1.5)
SODIUM SERPL-SCNC: 140 MMOL/L (ref 136–145)
T4 FREE SERPL-MCNC: 1.32 NG/DL (ref 0.93–1.7)
TIBC SERPL-MCNC: 253 UG/DL (ref 250–400)
TRIGL SERPL-MCNC: 79 MG/DL (ref 0–149)
TROPONIN, HIGH SENSITIVITY: 1675 NG/L (ref 0–22)
TSH SERPL DL<=0.005 MIU/L-ACNC: 5.96 UIU/ML (ref 0.27–4.2)
VIT B12 SERPL-MCNC: 444 PG/ML (ref 232–1245)
WBC # BLD AUTO: 7.8 K/UL (ref 4.8–10.8)

## 2024-11-02 PROCEDURE — 31500 INSERT EMERGENCY AIRWAY: CPT

## 2024-11-02 PROCEDURE — 07HT33Z INSERTION OF INFUSION DEVICE INTO BONE MARROW, PERCUTANEOUS APPROACH: ICD-10-PCS | Performed by: INTERNAL MEDICINE

## 2024-11-02 PROCEDURE — 36415 COLL VENOUS BLD VENIPUNCTURE: CPT

## 2024-11-02 PROCEDURE — 6360000002 HC RX W HCPCS: Performed by: INTERNAL MEDICINE

## 2024-11-02 PROCEDURE — 85307 ASSAY ACTIVATED PROTEIN C: CPT

## 2024-11-02 PROCEDURE — 84443 ASSAY THYROID STIM HORMONE: CPT

## 2024-11-02 PROCEDURE — 80053 COMPREHEN METABOLIC PANEL: CPT

## 2024-11-02 PROCEDURE — 85670 THROMBIN TIME PLASMA: CPT

## 2024-11-02 PROCEDURE — 82728 ASSAY OF FERRITIN: CPT

## 2024-11-02 PROCEDURE — 5A12012 PERFORMANCE OF CARDIAC OUTPUT, SINGLE, MANUAL: ICD-10-PCS | Performed by: INTERNAL MEDICINE

## 2024-11-02 PROCEDURE — 82607 VITAMIN B-12: CPT

## 2024-11-02 PROCEDURE — 84484 ASSAY OF TROPONIN QUANT: CPT

## 2024-11-02 PROCEDURE — 85613 RUSSELL VIPER VENOM DILUTED: CPT

## 2024-11-02 PROCEDURE — 83540 ASSAY OF IRON: CPT

## 2024-11-02 PROCEDURE — 86146 BETA-2 GLYCOPROTEIN ANTIBODY: CPT

## 2024-11-02 PROCEDURE — 83010 ASSAY OF HAPTOGLOBIN QUANT: CPT

## 2024-11-02 PROCEDURE — 6370000000 HC RX 637 (ALT 250 FOR IP): Performed by: INTERNAL MEDICINE

## 2024-11-02 PROCEDURE — 6360000004 HC RX CONTRAST MEDICATION: Performed by: INTERNAL MEDICINE

## 2024-11-02 PROCEDURE — 83090 ASSAY OF HOMOCYSTEINE: CPT

## 2024-11-02 PROCEDURE — 85300 ANTITHROMBIN III ACTIVITY: CPT

## 2024-11-02 PROCEDURE — C1769 GUIDE WIRE: HCPCS | Performed by: INTERNAL MEDICINE

## 2024-11-02 PROCEDURE — 2500000003 HC RX 250 WO HCPCS: Performed by: INTERNAL MEDICINE

## 2024-11-02 PROCEDURE — 2580000003 HC RX 258: Performed by: INTERNAL MEDICINE

## 2024-11-02 PROCEDURE — 5A1935Z RESPIRATORY VENTILATION, LESS THAN 24 CONSECUTIVE HOURS: ICD-10-PCS | Performed by: INTERNAL MEDICINE

## 2024-11-02 PROCEDURE — 85303 CLOT INHIBIT PROT C ACTIVITY: CPT

## 2024-11-02 PROCEDURE — 86147 CARDIOLIPIN ANTIBODY EA IG: CPT

## 2024-11-02 PROCEDURE — 85610 PROTHROMBIN TIME: CPT

## 2024-11-02 PROCEDURE — 0BH17EZ INSERTION OF ENDOTRACHEAL AIRWAY INTO TRACHEA, VIA NATURAL OR ARTIFICIAL OPENING: ICD-10-PCS | Performed by: INTERNAL MEDICINE

## 2024-11-02 PROCEDURE — 85520 HEPARIN ASSAY: CPT

## 2024-11-02 PROCEDURE — 84439 ASSAY OF FREE THYROXINE: CPT

## 2024-11-02 PROCEDURE — 2700000000 HC OXYGEN THERAPY PER DAY

## 2024-11-02 PROCEDURE — 83550 IRON BINDING TEST: CPT

## 2024-11-02 PROCEDURE — C1894 INTRO/SHEATH, NON-LASER: HCPCS | Performed by: INTERNAL MEDICINE

## 2024-11-02 PROCEDURE — 93458 L HRT ARTERY/VENTRICLE ANGIO: CPT | Performed by: INTERNAL MEDICINE

## 2024-11-02 PROCEDURE — 92950 HEART/LUNG RESUSCITATION CPR: CPT

## 2024-11-02 PROCEDURE — 2709999900 HC NON-CHARGEABLE SUPPLY: Performed by: INTERNAL MEDICINE

## 2024-11-02 PROCEDURE — 83735 ASSAY OF MAGNESIUM: CPT

## 2024-11-02 PROCEDURE — 85730 THROMBOPLASTIN TIME PARTIAL: CPT

## 2024-11-02 PROCEDURE — 85306 CLOT INHIBIT PROT S FREE: CPT

## 2024-11-02 PROCEDURE — 6360000002 HC RX W HCPCS

## 2024-11-02 PROCEDURE — 82746 ASSAY OF FOLIC ACID SERUM: CPT

## 2024-11-02 PROCEDURE — 4A023N7 MEASUREMENT OF CARDIAC SAMPLING AND PRESSURE, LEFT HEART, PERCUTANEOUS APPROACH: ICD-10-PCS | Performed by: INTERNAL MEDICINE

## 2024-11-02 PROCEDURE — 83036 HEMOGLOBIN GLYCOSYLATED A1C: CPT

## 2024-11-02 PROCEDURE — 82248 BILIRUBIN DIRECT: CPT

## 2024-11-02 PROCEDURE — 85025 COMPLETE CBC W/AUTO DIFF WBC: CPT

## 2024-11-02 PROCEDURE — 80061 LIPID PANEL: CPT

## 2024-11-02 PROCEDURE — B2151ZZ FLUOROSCOPY OF LEFT HEART USING LOW OSMOLAR CONTRAST: ICD-10-PCS | Performed by: INTERNAL MEDICINE

## 2024-11-02 PROCEDURE — 85045 AUTOMATED RETICULOCYTE COUNT: CPT

## 2024-11-02 PROCEDURE — B2111ZZ FLUOROSCOPY OF MULTIPLE CORONARY ARTERIES USING LOW OSMOLAR CONTRAST: ICD-10-PCS | Performed by: INTERNAL MEDICINE

## 2024-11-02 PROCEDURE — 99221 1ST HOSP IP/OBS SF/LOW 40: CPT | Performed by: THORACIC SURGERY (CARDIOTHORACIC VASCULAR SURGERY)

## 2024-11-02 PROCEDURE — 82962 GLUCOSE BLOOD TEST: CPT

## 2024-11-02 PROCEDURE — 81240 F2 GENE: CPT

## 2024-11-02 RX ORDER — ONDANSETRON 2 MG/ML
INJECTION INTRAMUSCULAR; INTRAVENOUS
Status: COMPLETED
Start: 2024-11-02 | End: 2024-11-02

## 2024-11-02 RX ORDER — NOREPINEPHRINE BITARTRATE 0.06 MG/ML
INJECTION, SOLUTION INTRAVENOUS
Status: DISCONTINUED
Start: 2024-11-02 | End: 2024-11-02 | Stop reason: HOSPADM

## 2024-11-02 RX ORDER — FUROSEMIDE 10 MG/ML
40 INJECTION INTRAMUSCULAR; INTRAVENOUS DAILY
Status: DISCONTINUED | OUTPATIENT
Start: 2024-11-03 | End: 2024-11-02 | Stop reason: HOSPADM

## 2024-11-02 RX ORDER — NOREPINEPHRINE BITARTRATE 0.06 MG/ML
1-100 INJECTION, SOLUTION INTRAVENOUS CONTINUOUS
Status: DISCONTINUED | OUTPATIENT
Start: 2024-11-02 | End: 2024-11-02 | Stop reason: HOSPADM

## 2024-11-02 RX ORDER — FENTANYL CITRATE 50 UG/ML
INJECTION, SOLUTION INTRAMUSCULAR; INTRAVENOUS PRN
Status: DISCONTINUED | OUTPATIENT
Start: 2024-11-02 | End: 2024-11-02 | Stop reason: HOSPADM

## 2024-11-02 RX ORDER — IOPAMIDOL 612 MG/ML
INJECTION, SOLUTION INTRAVASCULAR PRN
Status: DISCONTINUED | OUTPATIENT
Start: 2024-11-02 | End: 2024-11-02 | Stop reason: HOSPADM

## 2024-11-02 RX ORDER — PHENYLEPHRINE HYDROCHLORIDE 10 MG/ML
INJECTION INTRAVENOUS
Status: DISCONTINUED
Start: 2024-11-02 | End: 2024-11-02 | Stop reason: HOSPADM

## 2024-11-02 RX ORDER — SODIUM CHLORIDE 9 MG/ML
INJECTION, SOLUTION INTRAVENOUS PRN
Status: DISCONTINUED | OUTPATIENT
Start: 2024-11-02 | End: 2024-11-02 | Stop reason: HOSPADM

## 2024-11-02 RX ORDER — DEXMEDETOMIDINE HYDROCHLORIDE 4 UG/ML
INJECTION, SOLUTION INTRAVENOUS
Status: DISCONTINUED
Start: 2024-11-02 | End: 2024-11-02 | Stop reason: WASHOUT

## 2024-11-02 RX ORDER — PHENYLEPHRINE HYDROCHLORIDE 10 MG/ML
INJECTION INTRAVENOUS PRN
Status: DISCONTINUED | OUTPATIENT
Start: 2024-11-02 | End: 2024-11-02 | Stop reason: HOSPADM

## 2024-11-02 RX ORDER — ATROPINE SULFATE 0.1 MG/ML
INJECTION INTRAVENOUS PRN
Status: DISCONTINUED | OUTPATIENT
Start: 2024-11-02 | End: 2024-11-02 | Stop reason: HOSPADM

## 2024-11-02 RX ORDER — ONDANSETRON 2 MG/ML
4 INJECTION INTRAMUSCULAR; INTRAVENOUS ONCE
Status: COMPLETED | OUTPATIENT
Start: 2024-11-02 | End: 2024-11-02

## 2024-11-02 RX ORDER — MIDAZOLAM HYDROCHLORIDE 1 MG/ML
INJECTION, SOLUTION INTRAMUSCULAR; INTRAVENOUS PRN
Status: DISCONTINUED | OUTPATIENT
Start: 2024-11-02 | End: 2024-11-02 | Stop reason: HOSPADM

## 2024-11-02 RX ADMIN — HEPARIN SODIUM 14 UNITS/KG/HR: 10000 INJECTION, SOLUTION INTRAVENOUS at 07:34

## 2024-11-02 RX ADMIN — ASPIRIN 81 MG: 81 TABLET, COATED ORAL at 07:32

## 2024-11-02 RX ADMIN — ONDANSETRON 4 MG: 2 INJECTION INTRAMUSCULAR; INTRAVENOUS at 09:30

## 2024-11-02 RX ADMIN — POTASSIUM CHLORIDE 10 MEQ: 10 INJECTION, SOLUTION INTRAVENOUS at 03:00

## 2024-11-02 RX ADMIN — SODIUM CHLORIDE: 9 INJECTION, SOLUTION INTRAVENOUS at 07:40

## 2024-11-02 RX ADMIN — POTASSIUM CHLORIDE 10 MEQ: 10 INJECTION, SOLUTION INTRAVENOUS at 07:37

## 2024-11-02 RX ADMIN — POTASSIUM CHLORIDE 10 MEQ: 10 INJECTION, SOLUTION INTRAVENOUS at 04:16

## 2024-11-02 NOTE — PROGRESS NOTES
Notified clinical houseKhushboo, that Dr Bruno requested cath lab be called in.    Electronically signed by Rasheeda Saucedo RN on 11/2/2024 at 7:23 AM

## 2024-11-02 NOTE — PROCEDURES
PROCEDURE NOTE  Date: 11/2/2024   Name: Jones Muller  YOB: 1955    Intubation    Date/Time: 11/2/2024 9:41 AM    Performed by: Mike Fernandez MD  Authorized by: Mike Fernandez MD  Consent: The procedure was performed in an emergent situation.  Patient identity confirmed: arm band and hospital-assigned identification number  Time out: Immediately prior to procedure a \"time out\" was called to verify the correct patient, procedure, equipment, support staff and site/side marked as required.  Indications: respiratory failure  Intubation method: video-assisted  Preoxygenation: BVM  Sedatives: etomidate  Paralytic: succinylcholine  Laryngoscope size: Mac 4  Tube size: 7.5 mm  Tube type: cuffed  Number of attempts: 1  Cords visualized: yes  Post-procedure assessment: chest rise and CO2 detector  Breath sounds: equal  Cuff inflated: yes  ETT to lip: 23 cm  Tube secured with: ETT maynard  Patient tolerance: patient tolerated the procedure well with no immediate complications  Comments: CXR ordered to confirm placement

## 2024-11-02 NOTE — DISCHARGE SUMMARY
Hospitalist Discharge Summary  [facility]    Patient: Jones Muller  : 1955  MRN: [id number]  PCP: Brian García MD  Attending: Mike Fernandez MD  Admission Date: 2024  Discharge Date: 2024     Hospital Course:   69-year-old male with history of CAD s/p LAD stent in  admitted to critical care unit for NSTEMI and LLL PE.    Patient returned from cardiac cath this morning in cardiogenic shock with hemodynamic and respiratory instability.  Cardiac cath revealed significant left main and ostial LAD disease with echo showing severe dilated cardiomyopathy with biventricular dysfunction.  Cardiology/CTS at bedside.  Initial plans were for transfer to a tertiary care facility that would be able to provide biventricular mechanical support.  Unfortunately, patient suffered VF/VT arrest requiring shock x 2 soon after arrival to critical care unit.  As per ACLS protocol, chest compressions initiated and patient bagged via ambu.  Please refer to code narrator for detailed timeline, medications administered, and procedures performed.  Despite high-quality CPR for >15 minutes, we were unable to achieve ROSC.  Entire code team, including cardiology, in agreement with termination of code efforts given futility.  Patient pronounced at 1014 on 2024.      Discharge Exam:   Patient     Recent Labs     24  1250 24  0210   WBC 9.3 7.8   HGB 12.1* 11.7*    195     Recent Labs     24  1250 24  0210    140   K 4.2 3.0*    99   CO2 22 27   BUN 17 22   CREATININE 0.9 1.0   GLUCOSE 140* 101*     Recent Labs     24  1250 24  0210   AST 51* 40   ALT 38 32   BILITOT 2.7* 2.4*   ALKPHOS 50 44     Troponin T: No results for input(s): \"TROPONINI\" in the last 72 hours.  Pro-BNP: No results for input(s): \"BNP\" in the last 72 hours.  INR: No results for input(s): \"INR\" in the last 72 hours.  UA:No results for input(s): \"NITRITE\", \"COLORU\",

## 2024-11-02 NOTE — PROGRESS NOTES
Patient was are out of MICU for heart catheterization.  I would recommend to continue anticoagulation for cardiology.  Okay from my standpoint to continue heparin.  11/02/24  8:17 AM

## 2024-11-02 NOTE — PROCEDURES
PROCEDURE NOTE  Date: 11/2/2024   Name: Jones Muller  YOB: 1955    Intraosseous Line Insertion    Date/Time: 11/2/2024 10:07 AM    Performed by: Mike Fernandez MD  Authorized by: Mike Fernandez MD  Consent: The procedure was performed in an emergent situation.  Patient identity confirmed: arm band and hospital-assigned identification number  Indications: clinical deterioration, hemodynamic instability, fluid administration, medication administration and rapid vascular access  Indications: Cardiac arrest  Insertion site: right proximal tibia  Site preparation: chlorhexidine  Insertion device: drill device  Insertion: needle was inserted through the bony cortex  Number of attempts: 1  Confirmation method: stability of the needle, easy infusion of fluids and aspiration of blood/marrow  Secured with: transparent dressing  Patient tolerance: patient tolerated the procedure well with no immediate complications  Comments: EBL: 0 cc

## 2024-11-02 NOTE — CONSULTS
today..  He appears to be in acute pulmonary edema with congestive heart failure.  I feel the two-vessel bypass grafting alone  is not going to be of much benefit to this gentleman and that he is in biventricular failure with cardiac dysfunction that is likely irreversible..  The best option for this patient is for him  to be transferred to a center that can  provide biventricular mechanical support.  I discussed this at the bedside with the Dr. Fernandez and Dr. Bruno.     Electronically signed by Manuel Coombs MD on 11/2/2024 at 9:32 AM

## 2024-11-02 NOTE — PROGRESS NOTES
Patient returned from cath lab accompanied by Cardiologist and cath lab team.  Patient reportedly unstable after transfer from table, hypotensive and short of breath upon arrival to ICU, was still able to talk, c/o SOB and nausea.  Rashi gtt started, ICU nursing and Dr Fernandez at bedside.  Patient arrived on NRB.  Paged RT to bring bipap. Fluid bolus started.  Levo gtt added. Dr. Coombs came to bedside, plan to transfer to larger hospital.  Dr. Bruno on phone with [facility], accepting.  Dr Fernandez at bedside, decision to intubate due to deteriorating condition.    936 etomidate 20 given  937 levo gtt to 50, rashi gtt to 100  938 succ 50 given  940 succ 50 given, amp of atropine given per Dr Bruno  941 intubated 7.5 ETT, 23@ lip  943 amp of atropine given per Dr Bruno  944 epi given per Dr Bruno, due to low blood pressure and HR, called for epi gtt      958 asystole, epi given  1000 pulse check, vtach , shock 360  1001 amio 300, epi gtt/rashi gtt/levo gtt/ dopamine gtt maxed, epi given  1002 pulse check, v tach/v fib, shock   1004 pulse check- PEA, epi given   1006 pulse check- PEA  1007 epi given, IO placed right shin  1008 pulse check- PEA  1010 pulse check- PEA, epi given  1012 pulse check- PEA, bicarb given  1013 calcium given, epi given  1014 pulse check- no pulse, bedside ultrasound echo- no cardiac mov't, Time Of Death Announced by Dr Bruno (1014)      Clinical House Supervisor, Ian, was able to reach patient's wife (after multiple attempts) and notified of code event and pronounced dead.    Electronically signed by Rasheeda Saucedo RN on 11/2/2024 at 10:45 AM

## 2024-11-02 NOTE — PROGRESS NOTES
Cardiology Daily Note LEFTY ALVARENGA MD      Patient:  Jones Muller  [id number]    Patient Active Problem List    Diagnosis Date Noted    NSTEMI (non-ST elevated myocardial infarction) ([facility]) 11/01/2024       Admit Date:  11/1/2024    Admission Problem List: Present on Admission:   NSTEMI (non-ST elevated myocardial infarction) ([facility])      Cardiac Specific Data:  Specialty Problems          Cardiology Problems    * (Principal) NSTEMI (non-ST elevated myocardial infarction) ([facility])           Subjective:  Mr. Renzo oneal. No chest pain  Trop positive    Objective:   /65   Pulse 78   Temp 97.6 °F (36.4 °C) (Temporal)   Resp 23   Ht 1.803 m (5' 11\")   Wt 96.3 kg (212 lb 4.8 oz)   SpO2 95%   BMI 29.61 kg/m²       Intake/Output Summary (Last 24 hours) at 11/2/2024 0805  Last data filed at 11/2/2024 0651  Gross per 24 hour   Intake 899.18 ml   Output 6650 ml   Net -5750.82 ml       Prior to Admission medications    Medication Sig Start Date End Date Taking? Authorizing Provider   aspirin 81 MG EC tablet Take 1 tablet by mouth daily   Yes Provider, MD Petra        aspirin  81 mg Oral Daily    atorvastatin  40 mg Oral Nightly       TELEMETRY: Sinus     Physical Exam:      Physical Exam      General:  Awake, alert, NAD  Skin:  Warm and dry  Neck:  no jvd , no carotid bruits  Chest:  Clear to auscultation, no wheezing or rales  Cardiovascular:  RRR S1S2 no murmurs, clicks, gallups, or rubs  Abdomen:  Soft nontender, nondistended, bowel sounds present  Extremities:  Edema: Trace left Radial Artery withnl palpable pulses; Hematoma: No  Neuro: Intact neurovascular function Yes    Lab Data:  CBC:   Recent Labs     11/01/24  1250 11/02/24  0210   WBC 9.3 7.8   HGB 12.1* 11.7*   HCT 38.2* 35.2*   .6* 104.5*    195     BMP:   Recent Labs     11/01/24  1250 11/02/24  0210    140   K 4.2 3.0*    99   CO2 22 27   BUN 17 22   CREATININE 0.9 1.0     LIVER PROFILE:   Recent Labs

## 2024-11-02 NOTE — PLAN OF CARE
Problem: Discharge Planning  Goal: Discharge to home or other facility with appropriate resources  11/2/2024 0212 by Dennis Lindsay RN  Outcome: Progressing  Flowsheets (Taken 11/2/2024 0148)  Discharge to home or other facility with appropriate resources:   Identify barriers to discharge with patient and caregiver   Arrange for needed discharge resources and transportation as appropriate   Identify discharge learning needs (meds, wound care, etc)   Arrange for interpreters to assist at discharge as needed   Refer to discharge planning if patient needs post-hospital services based on physician order or complex needs related to functional status, cognitive ability or social support system  11/1/2024 1734 by Lesa Estrada RN  Outcome: Progressing     Problem: ABCDS Injury Assessment  Goal: Absence of physical injury  11/2/2024 0212 by Dennis Lindsay RN  Outcome: Progressing  11/1/2024 1734 by Lesa Estrada, RN  Outcome: Progressing     Problem: Pain  Goal: Verbalizes/displays adequate comfort level or baseline comfort level  11/2/2024 0212 by Dennis Lindsay RN  Outcome: Progressing  11/1/2024 1734 by Lesa Estrada RN  Outcome: Progressing     Problem: Safety - Adult  Goal: Free from fall injury  Outcome: Progressing

## 2024-11-02 NOTE — CODE DOCUMENTATION
Patient returned from cardiac cath this morning in cardiogenic shock with hemodynamic and respiratory instability.  Cardiac cath revealed significant left main and ostial LAD disease with echo showing severe dilated cardiomyopathy with biventricular dysfunction.  Cardiology/CTS at bedside.  Initial plans were for transfer to a tertiary care facility that would be able to provide biventricular mechanical support.  Unfortunately, patient suffered VF/VT arrest requiring shock x 2 soon after arrival to critical care unit.  As per ACLS protocol, chest compressions initiated and patient bagged via ambu.  Please refer to code narrator for detailed timeline, medications administered, and procedures performed.  Despite high-quality CPR for >15 minutes, we were unable to achieve ROSC.  Entire code team, including cardiology, in agreement with termination of code efforts given futility.  Patient pronounced at 1014 on 11/2/2024.    Mike Fernandez MD  11/2/2024

## 2024-11-03 LAB
ECHO BSA: 2.24 M2
EKG P AXIS: 42 DEGREES
EKG P-R INTERVAL: 154 MS
EKG Q-T INTERVAL: 370 MS
EKG QRS DURATION: 92 MS
EKG QTC CALCULATION (BAZETT): 455 MS
EKG T AXIS: 136 DEGREES

## 2024-11-03 PROCEDURE — 93970 EXTREMITY STUDY: CPT | Performed by: SURGERY

## 2024-11-03 PROCEDURE — 93010 ELECTROCARDIOGRAM REPORT: CPT | Performed by: INTERNAL MEDICINE

## 2024-11-08 LAB
ANNOTATION COMMENT IMP: ABNORMAL
APCR PPP: 3.42 {RATIO}
APTT SCREEN TO CONFIRM RATIO: 2.28 {RATIO}
AT III ACT/NOR PPP CHRO: 89 % (ref 76–128)
B2 GLYCOPROT1 IGG SERPL IA-ACNC: <10 SGU
B2 GLYCOPROT1 IGM SERPL IA-ACNC: <10 SMU
CARDIOLIPIN IGG SER IA-ACNC: <10 GPL
CARDIOLIPIN IGM SER IA-ACNC: <10 MPL
CONFIRM DRVVT STA-STACLOT: ABNORMAL S
DRVVT SCREEN TO CONFIRM RATIO: 0.94 {RATIO}
DRVVT SCREEN TO CONFIRM RATIO: ABNORMAL {RATIO}
DRVVT/DRVVT CFM P DOAC NEUT NORM PPP-RTO: ABNORMAL {RATIO}
F2 C.20210G>A GENO BLD/T: ABNORMAL
F5 P.R506Q BLD/T QL: ABNORMAL
HCYS SERPL-SCNC: 22 UMOL/L (ref 0–15)
HEPARIN NT PPP QL: ABNORMAL
LA 3 SCREEN W REFLEX-IMP: ABNORMAL
LMW HEPARIN IND PLT AB SER QL: PRESENT
MIXING DRVVT: ABNORMAL S
PROT C ACT/NOR PPP: 106 % (ref 83–168)
PROT S FREE AG ACT/NOR PPP IA: 101 % (ref 74–147)
PROTHROMBIN TIME: 14.5 S (ref 12–15.5)
SCREEN APTT: 1.01 S
SPECIMEN SOURCE: ABNORMAL
SPECIMEN SOURCE: ABNORMAL
THROMBIN TIME: 67.5 S

## 2024-11-11 LAB — ECHO BSA: 2.24 M2
